# Patient Record
Sex: FEMALE | Race: OTHER | NOT HISPANIC OR LATINO | URBAN - METROPOLITAN AREA
[De-identification: names, ages, dates, MRNs, and addresses within clinical notes are randomized per-mention and may not be internally consistent; named-entity substitution may affect disease eponyms.]

---

## 2017-11-10 VITALS
TEMPERATURE: 99 F | RESPIRATION RATE: 16 BRPM | DIASTOLIC BLOOD PRESSURE: 68 MMHG | OXYGEN SATURATION: 90 % | SYSTOLIC BLOOD PRESSURE: 97 MMHG | HEART RATE: 71 BPM

## 2017-11-10 PROCEDURE — 71020: CPT | Mod: 26

## 2017-11-10 PROCEDURE — 99285 EMERGENCY DEPT VISIT HI MDM: CPT

## 2017-11-10 RX ORDER — MAGNESIUM SULFATE 500 MG/ML
2 VIAL (ML) INJECTION ONCE
Qty: 0 | Refills: 0 | Status: COMPLETED | OUTPATIENT
Start: 2017-11-10 | End: 2017-11-10

## 2017-11-10 RX ORDER — IPRATROPIUM/ALBUTEROL SULFATE 18-103MCG
3 AEROSOL WITH ADAPTER (GRAM) INHALATION EVERY 6 HOURS
Qty: 0 | Refills: 0 | Status: DISCONTINUED | OUTPATIENT
Start: 2017-11-10 | End: 2017-11-11

## 2017-11-10 RX ORDER — ALBUTEROL 90 UG/1
1 AEROSOL, METERED ORAL EVERY 4 HOURS
Qty: 0 | Refills: 0 | Status: DISCONTINUED | OUTPATIENT
Start: 2017-11-10 | End: 2017-11-11

## 2017-11-10 RX ORDER — AZITHROMYCIN 500 MG/1
500 TABLET, FILM COATED ORAL ONCE
Qty: 0 | Refills: 0 | Status: COMPLETED | OUTPATIENT
Start: 2017-11-10 | End: 2017-11-10

## 2017-11-10 RX ADMIN — Medication 3 MILLILITER(S): at 17:45

## 2017-11-10 RX ADMIN — AZITHROMYCIN 500 MILLIGRAM(S): 500 TABLET, FILM COATED ORAL at 21:21

## 2017-11-10 RX ADMIN — Medication 3 MILLILITER(S): at 21:21

## 2017-11-10 RX ADMIN — Medication 50 GRAM(S): at 21:42

## 2017-11-10 RX ADMIN — Medication 3 MILLILITER(S): at 18:06

## 2017-11-10 RX ADMIN — Medication 60 MILLIGRAM(S): at 18:23

## 2017-11-10 NOTE — ED PROVIDER NOTE - CARE PLAN
Principal Discharge DX:	Mild intermittent asthma with exacerbation Principal Discharge DX:	Multifocal pneumonia  Secondary Diagnosis:	Asthma

## 2017-11-10 NOTE — ED PROVIDER NOTE - MEDICAL DECISION MAKING DETAILS
Pt  who admits to using methadone PTA presents c/o asthma exacerbation. Will give prednisone and albuterol treatment for asthma exacerbation. Pt  who admits to using methadone PTA presents c/o asthma exacerbation. Will give prednisone and albuterol treatment for asthma exacerbation. Will get CXR given swetha Pt  who admits to using methadone PTA presents c/o asthma exacerbation. Will give prednisone and albuterol treatment for asthma exacerbation. Will get CXR given rhonchi.  (SUKHJINDER Fagan) +multifocal PNA on imaging. Pt with persistent hypoxia, SOB and wheezing despite neb tx's, steroids and Mg Sulfate. ABX initiated and case discussed with Dr. Ellington who has accepted the pt to [facility].

## 2017-11-10 NOTE — ED PROVIDER NOTE - NS ED ROS FT
Denies fevers, chills, nausea, vomiting, diarrhea, constipation, abdominal pain, urinary symptoms, chest pain, palpitations, syncope/near syncope, cough/URI symptoms, headache, weakness, numbness, focal deficits, visual changes, gait or balance changes, dizziness

## 2017-11-10 NOTE — ED PROVIDER NOTE - OBJECTIVE STATEMENT
31 yo F with Hx of asthma presents c/o wheezing. Pt states has generalized body aches. Hx limited at this time due to pt being highly drowsy and falling asleep while giving Hx. Pt admits to triage on methadone.

## 2017-11-10 NOTE — ED PROVIDER NOTE - PHYSICAL EXAMINATION
VITAL SIGNS: I have reviewed nursing notes and confirm.  CONSTITUTIONAL: Drowsy but responds to physical stimulus.  SKIN: Skin is warm and dry, no acute rash.  HEAD: Normocephalic; atraumatic.  EYES: PERRL, EOM intact; conjunctiva and sclera clear.  ENT: No nasal discharge; airway clear.  NECK: Supple; non tender.  CARD: S1, S2 normal; no murmurs, gallops, or rubs. Regular rate and rhythm.  RESP: Bilateral diffuse wheezing.  ABD: Normal bowel sounds; soft; non-distended; non-tender; no hepatosplenomegaly.  EXT: Normal ROM. No clubbing, cyanosis or edema.  NEURO: Alert, oriented. Grossly unremarkable.  PSYCH: Cooperative, appropriate. VITAL SIGNS: I have reviewed nursing notes and confirm.  CONSTITUTIONAL: Drowsy/lethargic but responds to physical stimulus.  SKIN: Skin is warm and dry, no acute rash.  HEAD: Normocephalic; atraumatic.  EYES: PERRL, EOM intact; conjunctiva and sclera clear.  ENT: No nasal discharge; airway clear.  NECK: Supple; non tender.  CARD: S1, S2 normal; no murmurs, gallops, or rubs. Regular rate and rhythm.  RESP: Bilateral diffuse wheezing and rhonchi  ABD: Normal bowel sounds; soft; non-distended; non-tender; no hepatosplenomegaly.  EXT: Normal ROM. No clubbing, cyanosis or edema.  NEURO: Alert, oriented. Grossly unremarkable.  PSYCH: Cooperative, appropriate.

## 2017-11-11 ENCOUNTER — INPATIENT (INPATIENT)
Facility: HOSPITAL | Age: 32
LOS: 1 days | Discharge: HOME | DRG: 194 | End: 2017-11-13
Attending: STUDENT IN AN ORGANIZED HEALTH CARE EDUCATION/TRAINING PROGRAM | Admitting: STUDENT IN AN ORGANIZED HEALTH CARE EDUCATION/TRAINING PROGRAM
Payer: COMMERCIAL

## 2017-11-11 DIAGNOSIS — J45.901 UNSPECIFIED ASTHMA WITH (ACUTE) EXACERBATION: ICD-10-CM

## 2017-11-11 DIAGNOSIS — Z29.9 ENCOUNTER FOR PROPHYLACTIC MEASURES, UNSPECIFIED: ICD-10-CM

## 2017-11-11 DIAGNOSIS — B19.20 UNSPECIFIED VIRAL HEPATITIS C WITHOUT HEPATIC COMA: ICD-10-CM

## 2017-11-11 DIAGNOSIS — R63.8 OTHER SYMPTOMS AND SIGNS CONCERNING FOOD AND FLUID INTAKE: ICD-10-CM

## 2017-11-11 DIAGNOSIS — F19.10 OTHER PSYCHOACTIVE SUBSTANCE ABUSE, UNCOMPLICATED: ICD-10-CM

## 2017-11-11 DIAGNOSIS — J18.9 PNEUMONIA, UNSPECIFIED ORGANISM: ICD-10-CM

## 2017-11-11 LAB
ALBUMIN SERPL ELPH-MCNC: 3.4 G/DL — SIGNIFICANT CHANGE UP (ref 3.4–5)
ALP SERPL-CCNC: 78 U/L — SIGNIFICANT CHANGE UP (ref 40–120)
ALT FLD-CCNC: 223 U/L — HIGH (ref 12–42)
ANION GAP SERPL CALC-SCNC: 5 MMOL/L — LOW (ref 9–16)
AST SERPL-CCNC: 60 U/L — HIGH (ref 15–37)
BASOPHILS NFR BLD AUTO: 0.4 % — SIGNIFICANT CHANGE UP (ref 0–2)
BILIRUB SERPL-MCNC: 0.4 MG/DL — SIGNIFICANT CHANGE UP (ref 0.2–1.2)
BUN SERPL-MCNC: 8 MG/DL — SIGNIFICANT CHANGE UP (ref 7–23)
CALCIUM SERPL-MCNC: 8.9 MG/DL — SIGNIFICANT CHANGE UP (ref 8.5–10.5)
CHLORIDE SERPL-SCNC: 103 MMOL/L — SIGNIFICANT CHANGE UP (ref 96–108)
CO2 SERPL-SCNC: 31 MMOL/L — SIGNIFICANT CHANGE UP (ref 22–31)
CREAT SERPL-MCNC: 0.89 MG/DL — SIGNIFICANT CHANGE UP (ref 0.5–1.3)
EOSINOPHIL NFR BLD AUTO: 0.4 % — SIGNIFICANT CHANGE UP (ref 0–6)
GLUCOSE SERPL-MCNC: 260 MG/DL — HIGH (ref 70–99)
HCG UR QL: NEGATIVE — SIGNIFICANT CHANGE UP
HCT VFR BLD CALC: 40 % — SIGNIFICANT CHANGE UP (ref 34.5–45)
HGB BLD-MCNC: 13.2 G/DL — SIGNIFICANT CHANGE UP (ref 11.5–15.5)
IMM GRANULOCYTES NFR BLD AUTO: 0.4 % — SIGNIFICANT CHANGE UP (ref 0–1.5)
LYMPHOCYTES # BLD AUTO: 18.4 % — SIGNIFICANT CHANGE UP (ref 13–44)
MCHC RBC-ENTMCNC: 31.5 PG — SIGNIFICANT CHANGE UP (ref 27–34)
MCHC RBC-ENTMCNC: 33 G/DL — SIGNIFICANT CHANGE UP (ref 32–36)
MCV RBC AUTO: 95.5 FL — SIGNIFICANT CHANGE UP (ref 80–100)
MONOCYTES NFR BLD AUTO: 1.3 % — LOW (ref 2–14)
NEUTROPHILS NFR BLD AUTO: 79.1 % — HIGH (ref 43–77)
PCP SPEC-MCNC: SIGNIFICANT CHANGE UP
PLATELET # BLD AUTO: 234 K/UL — SIGNIFICANT CHANGE UP (ref 150–400)
POTASSIUM SERPL-MCNC: 4.2 MMOL/L — SIGNIFICANT CHANGE UP (ref 3.5–5.3)
POTASSIUM SERPL-SCNC: 4.2 MMOL/L — SIGNIFICANT CHANGE UP (ref 3.5–5.3)
PROT SERPL-MCNC: 7 G/DL — SIGNIFICANT CHANGE UP (ref 6.4–8.2)
RAPID RVP RESULT: SIGNIFICANT CHANGE UP
RBC # BLD: 4.19 M/UL — SIGNIFICANT CHANGE UP (ref 3.8–5.2)
RBC # FLD: 12.8 % — SIGNIFICANT CHANGE UP (ref 10.3–16.9)
SODIUM SERPL-SCNC: 139 MMOL/L — SIGNIFICANT CHANGE UP (ref 132–145)
WBC # BLD: 5.5 K/UL — SIGNIFICANT CHANGE UP (ref 3.8–10.5)
WBC # FLD AUTO: 5.5 K/UL — SIGNIFICANT CHANGE UP (ref 3.8–10.5)

## 2017-11-11 PROCEDURE — 99223 1ST HOSP IP/OBS HIGH 75: CPT | Mod: GC

## 2017-11-11 PROCEDURE — 71250 CT THORAX DX C-: CPT | Mod: 26

## 2017-11-11 RX ORDER — METHADONE HYDROCHLORIDE 40 MG/1
110 TABLET ORAL DAILY
Qty: 0 | Refills: 0 | Status: DISCONTINUED | OUTPATIENT
Start: 2017-11-11 | End: 2017-11-13

## 2017-11-11 RX ORDER — AZITHROMYCIN 500 MG/1
250 TABLET, FILM COATED ORAL DAILY
Qty: 0 | Refills: 0 | Status: DISCONTINUED | OUTPATIENT
Start: 2017-11-11 | End: 2017-11-12

## 2017-11-11 RX ORDER — METHADONE HYDROCHLORIDE 40 MG/1
110 TABLET ORAL DAILY
Qty: 0 | Refills: 0 | Status: DISCONTINUED | OUTPATIENT
Start: 2017-11-11 | End: 2017-11-11

## 2017-11-11 RX ORDER — NICOTINE POLACRILEX 2 MG
1 GUM BUCCAL DAILY
Qty: 0 | Refills: 0 | Status: DISCONTINUED | OUTPATIENT
Start: 2017-11-11 | End: 2017-11-13

## 2017-11-11 RX ORDER — CEFTRIAXONE 500 MG/1
1 INJECTION, POWDER, FOR SOLUTION INTRAMUSCULAR; INTRAVENOUS ONCE
Qty: 0 | Refills: 0 | Status: COMPLETED | OUTPATIENT
Start: 2017-11-11 | End: 2017-11-11

## 2017-11-11 RX ORDER — IPRATROPIUM/ALBUTEROL SULFATE 18-103MCG
3 AEROSOL WITH ADAPTER (GRAM) INHALATION EVERY 4 HOURS
Qty: 0 | Refills: 0 | Status: DISCONTINUED | OUTPATIENT
Start: 2017-11-11 | End: 2017-11-13

## 2017-11-11 RX ORDER — METHADONE HYDROCHLORIDE 40 MG/1
110 TABLET ORAL
Qty: 0 | Refills: 0 | COMMUNITY

## 2017-11-11 RX ORDER — GABAPENTIN 400 MG/1
1 CAPSULE ORAL
Qty: 0 | Refills: 0 | COMMUNITY

## 2017-11-11 RX ORDER — CEFTRIAXONE 500 MG/1
1 INJECTION, POWDER, FOR SOLUTION INTRAMUSCULAR; INTRAVENOUS EVERY 24 HOURS
Qty: 0 | Refills: 0 | Status: DISCONTINUED | OUTPATIENT
Start: 2017-11-12 | End: 2017-11-12

## 2017-11-11 RX ORDER — IPRATROPIUM/ALBUTEROL SULFATE 18-103MCG
3 AEROSOL WITH ADAPTER (GRAM) INHALATION ONCE
Qty: 0 | Refills: 0 | Status: COMPLETED | OUTPATIENT
Start: 2017-11-11 | End: 2017-11-11

## 2017-11-11 RX ORDER — TRAZODONE HCL 50 MG
100 TABLET ORAL AT BEDTIME
Qty: 0 | Refills: 0 | Status: DISCONTINUED | OUTPATIENT
Start: 2017-11-11 | End: 2017-11-13

## 2017-11-11 RX ADMIN — Medication 3 MILLILITER(S): at 04:21

## 2017-11-11 RX ADMIN — Medication 1 PATCH: at 11:15

## 2017-11-11 RX ADMIN — Medication 100 MILLIGRAM(S): at 23:35

## 2017-11-11 RX ADMIN — Medication 0.3 MILLIGRAM(S): at 11:52

## 2017-11-11 RX ADMIN — AZITHROMYCIN 250 MILLIGRAM(S): 500 TABLET, FILM COATED ORAL at 21:45

## 2017-11-11 RX ADMIN — CEFTRIAXONE 100 GRAM(S): 500 INJECTION, POWDER, FOR SOLUTION INTRAMUSCULAR; INTRAVENOUS at 04:52

## 2017-11-11 RX ADMIN — Medication 50 MILLIGRAM(S): at 11:16

## 2017-11-11 RX ADMIN — Medication 0.3 MILLIGRAM(S): at 21:45

## 2017-11-11 RX ADMIN — METHADONE HYDROCHLORIDE 110 MILLIGRAM(S): 40 TABLET ORAL at 11:12

## 2017-11-11 RX ADMIN — Medication 3 MILLILITER(S): at 23:35

## 2017-11-11 NOTE — H&P ADULT - NSHPLABSRESULTS_GEN_ALL_CORE
.  LABS:                         13.2   5.5   )-----------( 234      ( 11 Nov 2017 02:14 )             40.0     11-11    139  |  103  |  8   ----------------------------<  260<H>  4.2   |  31  |  0.89    Ca    8.9      11 Nov 2017 02:14    TPro  7.0  /  Alb  3.4  /  TBili  0.4  /  DBili  x   /  AST  60<H>  /  ALT  223<H>  /  AlkPhos  78  11-11    RADIOLOGY, EKG & ADDITIONAL TESTS:   < from: CT Chest No Cont (11.11.17 @ 01:31) >    EXAM:  CT CHEST                        PROCEDURE DATE:  11/11/2017      IMPRESSION:  Mild patchy consolidation within the lingula and left lower lobe, suspicious for multifocal pneumonia.

## 2017-11-11 NOTE — H&P ADULT - NSHPPHYSICALEXAM_GEN_ALL_CORE
.  VITAL SIGNS:  T(C): 36.7 (11-11-17 @ 06:32), Max: 37.1 (11-10-17 @ 17:33)  T(F): 98.1 (11-11-17 @ 06:32), Max: 98.7 (11-10-17 @ 17:33)  HR: 78 (11-11-17 @ 06:32) (71 - 84)  BP: 116/19 (11-11-17 @ 06:32) (97/68 - 119/80)  BP(mean): --  RR: 18 (11-11-17 @ 06:32) (16 - 18)  SpO2: 99% (11-11-17 @ 06:32) (90% - 99%)  Wt(kg): --    PHYSICAL EXAM:    General: AOx3, NAD, no respiratory distress, speaking in full sentences, coherent  HEENT: NCAT, PERRL, clear conjunctiva, no scleral icterus  Neck: supple, no JVD  Respiratory: wheezing b/l with prolonged expiratory phase; rhonchi, rales  Cardiovascular: RRR, normal S1S2, no M/R/G  Abdomen: soft, NT/ND, bowel sounds in all four quadrants, no palpable masses  Extremities: WWP, no clubbing, cyanosis, or edema; no track marks; RUE hyperpigementated flat lesion 2cm diameter; similar flat lesion on LLE lateral foot  Neuro: AOx3

## 2017-11-11 NOTE — H&P ADULT - PROBLEM SELECTOR PLAN 2
History of mild intermittent asthma, with wheezing x 2 days. Wheezing on initial exam, saturating 90% on RA. Given duonebs x 3 at [facility] and prednisone 60mg x 1.  - Continue with duonebs q4h prn for SOB/wheezing.  - Peak flows.  - Incentive spirometry.  - Asthma teaching. History of mild intermittent asthma, with wheezing x 2 days. Wheezing on initial exam, saturating 90% on RA. Given duonebs x 3 at [facility] and prednisone 60mg x 1, 50mg x 1 on 11/11.  - Continue with duonebs q4h prn for SOB/wheezing.  - Continue with prednisone 60mg qd.  - Check peak flows. Baseline 250.  - Incentive spirometry.  - Asthma teaching.

## 2017-11-11 NOTE — H&P ADULT - PROBLEM SELECTOR PLAN 6
VTE ppx: no indication for pharmacoprophylaxis at this time given low risk for VTE (IMPROVE score of 0).    Code: FULL CODE.

## 2017-11-11 NOTE — H&P ADULT - PROBLEM SELECTOR PLAN 1
Patient presented with SOB and wheezing x 2 days. No SIRS on presentation and hemodynamically stable, however desaturating to 90% on RA. Non-toxic appearing, wheezing on exam, no crackles. CT chest with mild patchy consolidation within the lingula and left lower lobe, suspicious for multifocal pneumonia. Given ceftriaxone and azithromycin at [facility].  - Continue on ceftriaxone and azithromycin. Patient with MRSA risk factor (former IVDU, past incarceration), but given non-toxic appearance, afebrile, and no leukocytosis, will treat for CAP at this time.  - Follow up RVP.

## 2017-11-11 NOTE — H&P ADULT - PROBLEM SELECTOR PLAN 4
Reported history of hepatitis C. , AST 60. No signs of acute decompensation.  - No acute intervention. Reported history of hepatitis C. , AST 60. No signs of acute decompensation.  - Screen for HIV, HBV.

## 2017-11-11 NOTE — H&P ADULT - ATTENDING COMMENTS
Pt seen and examined by me at bedside. Agree with housestaff's exam/a/p as noted above with addition, s  states has hx of asthma takes albuterol prn, no hx of intubation for asthma but for IVDU.   was at [facility] approx 2 weeks ago for asthma exacerbation and prescribed with steroid and abx (?bactrim)     VSS  speaking full sentences and ambulating in the hallways   +S1/S2 RRR  +diffuse wheezing, fair air entry  labs reviewed    a/p:  1. Asthma exacerbation 2/2 multifocal PNA: ok to c/w prednisone 60mg po qdaily, c/w ceftriaxone/azithromycin, nebs atc, daily peak flow (baseline 250 as per pt)  2. IVDU: on methadone/clonidine  Agree with rest of a/p as above  Dispo: pending clinical improvement.

## 2017-11-11 NOTE — H&P ADULT - HISTORY OF PRESENT ILLNESS
31 y/o F with PMHx of multi-substance abuse (heroin, benzos) recently discharged from detox, mild intermittent asthma (dx 2014, no hospitalizations, no intubations), presented to [facility] with SOB x 2 days.    Otherwise denies fever, chills, lightheadedness, CP, palpitations, SOB, cough, URI symptoms, N/V/D/C, abdominal pain, dysuria, hematuria, changes in bowel habits, LE edema.    In the ED, T   CT chest with mild patchy consolidation within the lingula and left lower lobe    PMHx: as above.  Surg hx: as above.  Meds: methadone 110mg (verified with [facility] [place] [phone] with LUKE Shafer LPN), clonidine 0.3mg bid,   All: toradol, tramadol  Soc hx:   Fhx: non-contributory 31 y/o F with PMHx of Hepatitis C, multi-substance abuse (former IV, current sniffed heroin, Xanax abuse, intubated for heroin/benzo overdose 1 year ago at [place]), recently discharged from detox, mild intermittent asthma (dx 2014, no hospitalizations, no intubations), presented to [facility] with SOB and wheezing x 2 days. Also complains of lightheadedness, sore throat, increased sinus fullness, and ear fullness x 2 days. Denies fever, chills, cough, hemoptysis, rhinorrhea, sneezing, recent travel, sick contacts. Was in detox at [facility] for benzo abuse and discharged two days ago. Patient complained of SOB and wheezing on her last day at detox, but states that they withheld her albuterol inhaler. Completed librium taper, 50mg to 25mg to 10mg last dose Thursday, as per patient. Was planned for transfer to rehab at [facility], but bed was not available. Last methadone dose 110mg yesterday. Goes to [facility]. Otherwise denies CP, palpitations, N/V/D/C, abdominal pain, dysuria, hematuria, changes in bowel habits, LE edema.    In the ED, T 98.7, HR 71-84, BP /68-80, RR 16-18, O2 90-95% on RA. Labs notable for no leukocytosis, , AST 60. CT chest with mild patchy consolidation within the lingula and left lower lobe. Given prednisone 60mg x 1, ceftriaxone 1g, azithromycin 500mg x 1, duonebs x 3, Mg 2g x 1.     PMHx: as above.  Surg hx: as above.  Meds: methadone 110mg (verified with [facility] [phone] with LUKE Shafer LPN), clonidine 0.3mg bid, gabapentin 600mg q6h.   All: toradol, tramadol  Soc hx: tobacco 1.5 ppd x 16 years, EtOH denies, quit "real" heroin 6 months ago, still uses heroin derivative in [facility] called "fent" occasionally but has not used for two weeks, her mother has guardianship of her 11 year old son, recent incarcerations for home invasion, burglary, assault/battery  Fhx: non-contributory 33 y/o F with PMHx of Hepatitis C, multi-substance abuse (former IV, current sniffed heroin, Xanax abuse, intubated for heroin/benzo overdose 1 year ago at [place]), recently discharged from detox, mild intermittent asthma (dx 2014, no hospitalizations, no intubations), presented to [facility] with SOB and wheezing x 2 days. Also complains of lightheadedness, sore throat, increased sinus fullness, and ear fullness x 2 days. Denies fever, chills, cough, hemoptysis, rhinorrhea, sneezing, recent travel, sick contacts. Was in detox at [facility] for benzo abuse and discharged two days ago. Patient complained of SOB and wheezing on her last day at detox, but states that they withheld her albuterol inhaler. Completed librium taper, 50mg to 25mg to 10mg last dose Thursday, as per patient. Was planned for transfer to rehab at [facility], but bed was not available. Last methadone dose 110mg yesterday. Goes to [facility]. Otherwise denies CP, palpitations, N/V/D/C, abdominal pain, dysuria, hematuria, changes in bowel habits, LE edema.    In the ED, T 98.7, HR 71-84, BP /68-80, RR 16-18, O2 90-95% on RA. Labs notable for no leukocytosis, , AST 60. CT chest with mild patchy consolidation within the lingula and left lower lobe. Given prednisone 60mg x 1, ceftriaxone 1g, azithromycin 500mg x 1, duonebs x 3, Mg 2g x 1.     PMHx: as above.  Surg hx: as above.  Meds: methadone 110mg (verified with [facility] [phone] with LUKE Shafer LPN), clonidine 0.3mg bid, gabapentin 600mg q6h.   All: toradol, tramadol  Soc hx: tobacco 1.5 ppd x 16 years, EtOH denies, quit "real" heroin 6 months ago, still uses heroin derivative in [facility] called "fent" occasionally but has not used for two weeks, Xanax was taking 2mg tabs 15x/day previously, her mother has guardianship of her 11 year old son, recent incarcerations for home invasion, burglary, assault/battery  Fhx: non-contributory

## 2017-11-11 NOTE — H&P ADULT - PROBLEM SELECTOR PLAN 3
History of former IV heroin use, current heroin sniffing, and recent Xanax abuse, s/p detox stay. Last heroin use two weeks ago. Last methadone dose 110mg yesterday. Goes to [facility], confirmed 110mg dose. Does not appear to be actively withdrawing.  - Continue with methadone 110mg qd. History of former IV heroin use, current heroin sniffing, and recent Xanax abuse, s/p detox stay. Last heroin use two weeks ago. Last methadone dose 110mg yesterday. Goes to [facility], confirmed 110mg dose. Does not appear to be actively withdrawing.  - Continue with methadone 110mg qd.  - Continue with clonidine 0.3mg bid.

## 2017-11-11 NOTE — H&P ADULT - ASSESSMENT
31 y/o F with PMHx of polysubstance abuse (heroin, benzos) recently discharged from detox, mild intermittent asthma (dx 2014, no hospitalizations, no intubations), presented to [facility] with SOB x 2 days.

## 2017-11-12 PROCEDURE — 93010 ELECTROCARDIOGRAM REPORT: CPT

## 2017-11-12 PROCEDURE — 99233 SBSQ HOSP IP/OBS HIGH 50: CPT | Mod: GC

## 2017-11-12 RX ORDER — GLYCERIN 1 %
1 DROPS OPHTHALMIC (EYE)
Qty: 0 | Refills: 0 | Status: DISCONTINUED | OUTPATIENT
Start: 2017-11-12 | End: 2017-11-13

## 2017-11-12 RX ADMIN — AZITHROMYCIN 250 MILLIGRAM(S): 500 TABLET, FILM COATED ORAL at 10:04

## 2017-11-12 RX ADMIN — Medication 0.3 MILLIGRAM(S): at 21:55

## 2017-11-12 RX ADMIN — Medication 3 MILLILITER(S): at 14:02

## 2017-11-12 RX ADMIN — Medication 1 DROP(S): at 15:46

## 2017-11-12 RX ADMIN — Medication 3 MILLILITER(S): at 21:55

## 2017-11-12 RX ADMIN — Medication 1 PATCH: at 10:05

## 2017-11-12 RX ADMIN — Medication 60 MILLIGRAM(S): at 06:30

## 2017-11-12 RX ADMIN — Medication 1 PATCH: at 11:41

## 2017-11-12 RX ADMIN — METHADONE HYDROCHLORIDE 110 MILLIGRAM(S): 40 TABLET ORAL at 10:04

## 2017-11-12 RX ADMIN — Medication 3 MILLILITER(S): at 10:04

## 2017-11-12 RX ADMIN — Medication 100 MILLIGRAM(S): at 21:55

## 2017-11-12 RX ADMIN — Medication 0.3 MILLIGRAM(S): at 10:04

## 2017-11-12 NOTE — DISCHARGE NOTE ADULT - HOSPITAL COURSE
Patient 32 year old woman with hx of substance abuse and asthma who presented with acute worsening of SOB.  Patient is afebrile, non-tachycardic, blood pressure stable. Patient CXR suggestive of bacterial pneumonia started on ceftriaxone and azithromycin.  Patient lost IV access on Saturday night switched to PO levaquin.  Patient discharged on cefpoxidime (7 days), and azithromycin (5 days).

## 2017-11-12 NOTE — DISCHARGE NOTE ADULT - CARE PROVIDERS DIRECT ADDRESSES
[email or web address]

## 2017-11-12 NOTE — PROGRESS NOTE ADULT - PROBLEM SELECTOR PLAN 3
on methadone/clonidine
History of former IV heroin use, current heroin sniffing, and recent Xanax abuse, s/p detox stay. Last heroin use two weeks ago. Last methadone dose 110mg yesterday. Goes to [facility], confirmed 110mg dose. Does not appear to be actively withdrawing.  - Continue with methadone 110mg qd.  - Continue with clonidine 0.3mg bid.

## 2017-11-12 NOTE — DISCHARGE NOTE ADULT - CARE PLAN
Principal Discharge DX:	Multifocal pneumonia  Goal:	Resolution  Instructions for follow-up, activity and diet:	Please continue with antibiotics as prescribed.  Secondary Diagnosis:	Asthma  Instructions for follow-up, activity and diet:	Please continue with prednisone taper as prescribed  Secondary Diagnosis:	Substance abuse  Instructions for follow-up, activity and diet:	Please continue with your outpatient regimen. Principal Discharge DX:	Asthma exacerbation  Goal:	Resolution  Instructions for follow-up, activity and diet:	Please continue with prednisone taper as prescribed  Secondary Diagnosis:	Multifocal pneumonia  Instructions for follow-up, activity and diet:	please continue with your antibiotics as presecribed  Secondary Diagnosis:	Substance abuse  Instructions for follow-up, activity and diet:	Please continue with your outpatient regimen.  Secondary Diagnosis:	Hepatitis C  Instructions for follow-up, activity and diet:	please followup with your PMD.

## 2017-11-12 NOTE — PROGRESS NOTE ADULT - SUBJECTIVE AND OBJECTIVE BOX
Patient is a 32y old  Female who presents with a chief complaint of SOB and wheezing (12 Nov 2017 11:30)      INTERVAL HPI/OVERNIGHT EVENTS:    Review of Systems: 12 point review of systems otherwise negative    MEDICATIONS  (STANDING):  ALBUTerol/ipratropium for Nebulization 3 milliLiter(s) Nebulizer every 4 hours  cloNIDine 0.3 milliGRAM(s) Oral two times a day  levoFLOXacin  Tablet 500 milliGRAM(s) Oral every 24 hours  methadone    Tablet 110 milliGRAM(s) Oral daily  nicotine -  14 mG/24Hr(s) Patch 1 patch Transdermal daily  predniSONE   Tablet 60 milliGRAM(s) Oral daily  traZODone 100 milliGRAM(s) Oral at bedtime    MEDICATIONS  (PRN):      Allergies    Toradol (Rash)  tramadol (Rash)    Intolerances          Vital Signs Last 24 Hrs  T(C): 36.3 (12 Nov 2017 09:00), Max: 37.1 (11 Nov 2017 16:20)  T(F): 97.3 (12 Nov 2017 09:00), Max: 98.7 (11 Nov 2017 16:20)  HR: 65 (12 Nov 2017 09:00) (53 - 76)  BP: 114/77 (12 Nov 2017 09:00) (114/77 - 129/87)  BP(mean): --  RR: 18 (12 Nov 2017 09:00) (17 - 18)  SpO2: 93% (12 Nov 2017 09:00) (93% - 94%)  CAPILLARY BLOOD GLUCOSE            Physical Exam:    Daily     Daily   General:  Well appearing, NAD, speaking full sentences  CV:  RRR, no murmur, no JVD  Lungs:  +scattered wheezing, fair air entry  :  No baliey  Neuro:  AAOx3, non-focal, CN II-XII grossly intact      LABS:                        13.2   5.5   )-----------( 234      ( 11 Nov 2017 02:14 )             40.0     11-11    139  |  103  |  8   ----------------------------<  260<H>  4.2   |  31  |  0.89    Ca    8.9      11 Nov 2017 02:14    TPro  7.0  /  Alb  3.4  /  TBili  0.4  /  DBili  x   /  AST  60<H>  /  ALT  223<H>  /  AlkPhos  78  11-11

## 2017-11-12 NOTE — PROGRESS NOTE ADULT - ASSESSMENT
31 y/o F with PMHx of polysubstance abuse (heroin, benzos) recently discharged from detox, mild intermittent asthma (dx 2014, no hospitalizations, no intubations), presented to [facility] with SOB x 2 days.

## 2017-11-12 NOTE — DISCHARGE NOTE ADULT - PLAN OF CARE
Resolution Please continue with antibiotics as prescribed. Please continue with prednisone taper as prescribed Please continue with your outpatient regimen. please continue with your antibiotics as presecribed please followup with your PMD.

## 2017-11-12 NOTE — DISCHARGE NOTE ADULT - CARE PROVIDER_API CALL
Savannah Angel), Internal Medicine  [address] [address] [place]  [address]  [place], [place] [address]  Phone: [phone]  Fax: [phone]

## 2017-11-12 NOTE — PROGRESS NOTE ADULT - PROBLEM SELECTOR PLAN 1
Patient presented with SOB and wheezing x 2 days. No SIRS on presentation and hemodynamically stable, however desaturating to 90% on RA. Non-toxic appearing, wheezing on exam, no crackles. CT chest with mild patchy consolidation within the lingula and left lower lobe, suspicious for multifocal pneumonia. Given ceftriaxone and azithromycin at [facility].  - Continue on ceftriaxone and azithromycin. Patient with MRSA risk factor (former IVDU, past incarceration), but given non-toxic appearance, afebrile, and no leukocytosis, will treat for CAP at this time.  - Follow up RVP. Patient presented with SOB and wheezing x 2 days. No SIRS on presentation and hemodynamically stable, however desaturating to 90% on RA. Non-toxic appearing, wheezing on exam, no crackles. CT chest with mild patchy consolidation within the lingula and left lower lobe, suspicious for multifocal pneumonia. Given ceftriaxone and azithromycin at [facility].  - Continued on ceftriaxone and azithromycin. Patient with MRSA risk factor (former IVDU, past incarceration), but given non-toxic appearance, afebrile, and no leukocytosis, will treat for CAP at this time.  - Patient lost IV access, started on levaquin 750mg q 24 tx for CAP Patient presented with SOB and wheezing x 2 days. No SIRS on presentation and hemodynamically stable, however desaturating to 90% on RA. Non-toxic appearing, wheezing on exam, no crackles. CT chest with mild patchy consolidation within the lingula and left lower lobe, suspicious for multifocal pneumonia. Given ceftriaxone and azithromycin at [facility].  - Continued on ceftriaxone and azithromycin. Patient with MRSA risk factor (former IVDU, past incarceration), but given non-toxic appearance, afebrile, and no leukocytosis, will treat for CAP at this time.  - Patient lost IV access, started on levaquin 750mg q 24 tx for CAP  - RVP negative

## 2017-11-12 NOTE — DISCHARGE NOTE ADULT - MEDICATION SUMMARY - MEDICATIONS TO TAKE
I will START or STAY ON the medications listed below when I get home from the hospital:    Deltasone 20 mg oral tablet  -- Prednisone taper 60, 60, 40, 40, 20, 20 MDD:Prenisone taper 60 (3tabs), 60, 40 (2tabs), 40, 20 (1tab), 20  -- Indication: For Asthma    methadone  -- 110 milligram(s) by mouth once a day  -- Indication: For Polysubstance abuse    cloNIDine 0.3 mg oral tablet  -- 1 tab(s) by mouth every 8 hours  -- Indication: For Anxiety    gabapentin 600 mg oral tablet  -- 1 tab(s) by mouth every 6 hours  -- Indication: For Pain    cefpodoxime 200 mg oral tablet  -- 1 tab(s) by mouth 2 times a day   -- Finish all this medication unless otherwise directed by prescriber.  Take with food or milk.    -- Indication: For CAP    azithromycin 250 mg oral tablet  -- 2 tabs day 1, 1 tab daily 2-5  -- Do not take dairy products, antacids, or iron preparations within one hour of this medication.  Finish all this medication unless otherwise directed by prescriber.    -- Indication: For CAP

## 2017-11-12 NOTE — DISCHARGE NOTE ADULT - PATIENT PORTAL LINK FT
“You can access the [facility] Patient Portal, offered by [facility], by registering with the following website: [email or web address]”

## 2017-11-12 NOTE — PROGRESS NOTE ADULT - PROBLEM SELECTOR PLAN 4
Reported history of hepatitis C. , AST 60. No signs of acute decompensation.  - Screen for HIV, HBV. Reported history of hepatitis C. , AST 60. No signs of acute decompensation.

## 2017-11-12 NOTE — PROGRESS NOTE ADULT - SUBJECTIVE AND OBJECTIVE BOX
INTERVAL HPI/OVERNIGHT EVENTS:  Patient was seen and examined at bedside.  Patient lost IV access yesterday.  Patient complaining of some SOB.  Denies fevers or chills. Denies cough. ROS otherwise negative.     VITAL SIGNS:  T(F): 97.3 (11-12-17 @ 09:00)  HR: 65 (11-12-17 @ 09:00)  BP: 114/77 (11-12-17 @ 09:00)  RR: 18 (11-12-17 @ 09:00)  SpO2: 93% (11-12-17 @ 09:00)      PHYSICAL EXAM:    Constitutional: WDWN, NAD  HEENT: PERRL, EOMI,  no JVD, MMM.  Respiratory: Patient with diffuse wheezing anteriorly and posteriorly.  Without accessory muscle use.  Patient satting 9  Cardiovascular: RRR, normal S1S2, no M/R/G  Gastrointestinal: soft, NTND, no masses palpable, BS normal  Extremities: Warm, well perfused, pulses equal bilateral upper and lower extremities, no edema, no clubbing  Neurological: AAOx3, CN Grossly intact  Skin: Normal temperature, warm, dry    MEDICATIONS  (STANDING):  ALBUTerol/ipratropium for Nebulization 3 milliLiter(s) Nebulizer every 4 hours  cloNIDine 0.3 milliGRAM(s) Oral two times a day  levoFLOXacin  Tablet 500 milliGRAM(s) Oral every 24 hours  methadone    Tablet 110 milliGRAM(s) Oral daily  nicotine -  14 mG/24Hr(s) Patch 1 patch Transdermal daily  predniSONE   Tablet 60 milliGRAM(s) Oral daily  traZODone 100 milliGRAM(s) Oral at bedtime    MEDICATIONS  (PRN):      Allergies    Toradol (Rash)  tramadol (Rash)    Intolerances        LABS:                        13.2   5.5   )-----------( 234      ( 11 Nov 2017 02:14 )             40.0     11-11    139  |  103  |  8   ----------------------------<  260<H>  4.2   |  31  |  0.89    Ca    8.9      11 Nov 2017 02:14    TPro  7.0  /  Alb  3.4  /  TBili  0.4  /  DBili  x   /  AST  60<H>  /  ALT  223<H>  /  AlkPhos  78  11-11          RADIOLOGY & ADDITIONAL TESTS:  Reviewed INTERVAL HPI/OVERNIGHT EVENTS:  Patient was seen and examined at bedside.  Patient lost IV access yesterday.  Patient complaining of some SOB.  Denies fevers or chills. Denies cough. ROS otherwise negative.     VITAL SIGNS:  T(F): 97.3 (11-12-17 @ 09:00)  HR: 65 (11-12-17 @ 09:00)  BP: 114/77 (11-12-17 @ 09:00)  RR: 18 (11-12-17 @ 09:00)  SpO2: 93% (11-12-17 @ 09:00)      PHYSICAL EXAM:    Constitutional: WDWN, NAD  HEENT: PERRL, EOMI,  no JVD, MMM.  Respiratory: Patient with diffuse wheezing anteriorly and posteriorly.  Without accessory muscle use.  Patient satting 91 sitting, 92 walking.  Cardiovascular: RRR, normal S1S2, no M/R/G  Gastrointestinal: soft, NTND,  BS normal  Extremities: Warm, well perfused, no edema.  Neurological: AAOx3, CN Grossly intact  Skin: Normal temperature, warm, dry    MEDICATIONS  (STANDING):  ALBUTerol/ipratropium for Nebulization 3 milliLiter(s) Nebulizer every 4 hours  cloNIDine 0.3 milliGRAM(s) Oral two times a day  levoFLOXacin  Tablet 500 milliGRAM(s) Oral every 24 hours  methadone    Tablet 110 milliGRAM(s) Oral daily  nicotine -  14 mG/24Hr(s) Patch 1 patch Transdermal daily  predniSONE   Tablet 60 milliGRAM(s) Oral daily  traZODone 100 milliGRAM(s) Oral at bedtime    MEDICATIONS  (PRN):      Allergies    Toradol (Rash)  tramadol (Rash)    Intolerances        LABS:                        13.2   5.5   )-----------( 234      ( 11 Nov 2017 02:14 )             40.0     11-11    139  |  103  |  8   ----------------------------<  260<H>  4.2   |  31  |  0.89    Ca    8.9      11 Nov 2017 02:14    TPro  7.0  /  Alb  3.4  /  TBili  0.4  /  DBili  x   /  AST  60<H>  /  ALT  223<H>  /  AlkPhos  78  11-11          RADIOLOGY & ADDITIONAL TESTS:  Reviewed

## 2017-11-12 NOTE — PROGRESS NOTE ADULT - PROBLEM SELECTOR PLAN 2
History of mild intermittent asthma, with wheezing x 2 days. Wheezing on initial exam, saturating 90% on RA. Given duonebs x 3 at [facility] and prednisone 60mg x 1, 50mg x 1 on 11/11.  - Continue with duonebs q4h prn for SOB/wheezing.  - Continue with prednisone 60mg qd.  - Check peak flows. Baseline 250.  - Incentive spirometry.  - Asthma teaching. History of mild intermittent asthma, with wheezing x 2 days. Wheezing on initial exam, saturating 90% on RA. Given duonebs x 3 at [facility] and prednisone 60mg x 1, 50mg x 1 on 11/11.  - Continue with duonebs q4h prn for SOB/wheezing.  - Continue with prednisone 60mg qd, on discharge will send on taper [id number]  - Asthma teaching. History of mild intermittent asthma, with wheezing x 2 days. Wheezing on initial exam, saturating 90% on RA. Given duonebs x 3 at [facility] and prednisone 60mg x 1, 50mg x 1 on 11/11.  - Continue with duonebs q4h standing for SOB/wheezing.  - Continue with prednisone 60mg qd, on discharge will send on taper [id number]  - Asthma teaching.

## 2017-11-13 VITALS
DIASTOLIC BLOOD PRESSURE: 80 MMHG | SYSTOLIC BLOOD PRESSURE: 125 MMHG | RESPIRATION RATE: 18 BRPM | TEMPERATURE: 98 F | OXYGEN SATURATION: 94 % | HEART RATE: 55 BPM

## 2017-11-13 PROCEDURE — 81025 URINE PREGNANCY TEST: CPT

## 2017-11-13 PROCEDURE — 99239 HOSP IP/OBS DSCHRG MGMT >30: CPT

## 2017-11-13 PROCEDURE — 93005 ELECTROCARDIOGRAM TRACING: CPT

## 2017-11-13 PROCEDURE — 87633 RESP VIRUS 12-25 TARGETS: CPT

## 2017-11-13 PROCEDURE — 80307 DRUG TEST PRSMV CHEM ANLYZR: CPT

## 2017-11-13 PROCEDURE — 85025 COMPLETE CBC W/AUTO DIFF WBC: CPT

## 2017-11-13 PROCEDURE — 87798 DETECT AGENT NOS DNA AMP: CPT

## 2017-11-13 PROCEDURE — 99285 EMERGENCY DEPT VISIT HI MDM: CPT | Mod: 25

## 2017-11-13 PROCEDURE — 94640 AIRWAY INHALATION TREATMENT: CPT

## 2017-11-13 PROCEDURE — 71250 CT THORAX DX C-: CPT

## 2017-11-13 PROCEDURE — 87486 CHLMYD PNEUM DNA AMP PROBE: CPT

## 2017-11-13 PROCEDURE — 96375 TX/PRO/DX INJ NEW DRUG ADDON: CPT

## 2017-11-13 PROCEDURE — 96374 THER/PROPH/DIAG INJ IV PUSH: CPT

## 2017-11-13 PROCEDURE — 87581 M.PNEUMON DNA AMP PROBE: CPT

## 2017-11-13 PROCEDURE — 80053 COMPREHEN METABOLIC PANEL: CPT

## 2017-11-13 PROCEDURE — 71046 X-RAY EXAM CHEST 2 VIEWS: CPT

## 2017-11-13 RX ORDER — CEFPODOXIME PROXETIL 100 MG
1 TABLET ORAL
Qty: 14 | Refills: 0 | OUTPATIENT
Start: 2017-11-13 | End: 2017-11-20

## 2017-11-13 RX ORDER — AZITHROMYCIN 500 MG/1
1 TABLET, FILM COATED ORAL
Qty: 6 | Refills: 0 | OUTPATIENT
Start: 2017-11-13

## 2017-11-13 RX ADMIN — Medication 0.3 MILLIGRAM(S): at 08:29

## 2017-11-13 RX ADMIN — Medication 60 MILLIGRAM(S): at 06:43

## 2017-11-13 RX ADMIN — METHADONE HYDROCHLORIDE 110 MILLIGRAM(S): 40 TABLET ORAL at 06:44

## 2017-11-15 DIAGNOSIS — J45.21 MILD INTERMITTENT ASTHMA WITH (ACUTE) EXACERBATION: ICD-10-CM

## 2017-11-15 DIAGNOSIS — R09.02 HYPOXEMIA: ICD-10-CM

## 2017-11-15 DIAGNOSIS — Z88.5 ALLERGY STATUS TO NARCOTIC AGENT: ICD-10-CM

## 2017-11-15 DIAGNOSIS — F13.19 SEDATIVE, HYPNOTIC OR ANXIOLYTIC ABUSE WITH UNSPECIFIED SEDATIVE, HYPNOTIC OR ANXIOLYTIC-INDUCED DISORDER: ICD-10-CM

## 2017-11-15 DIAGNOSIS — Z79.891 LONG TERM (CURRENT) USE OF OPIATE ANALGESIC: ICD-10-CM

## 2017-11-15 DIAGNOSIS — F41.9 ANXIETY DISORDER, UNSPECIFIED: ICD-10-CM

## 2017-11-15 DIAGNOSIS — J18.9 PNEUMONIA, UNSPECIFIED ORGANISM: ICD-10-CM

## 2017-11-15 DIAGNOSIS — Z86.19 PERSONAL HISTORY OF OTHER INFECTIOUS AND PARASITIC DISEASES: ICD-10-CM

## 2017-11-15 DIAGNOSIS — F17.210 NICOTINE DEPENDENCE, CIGARETTES, UNCOMPLICATED: ICD-10-CM

## 2017-11-15 DIAGNOSIS — F11.10 OPIOID ABUSE, UNCOMPLICATED: ICD-10-CM

## 2017-11-15 DIAGNOSIS — Z88.8 ALLERGY STATUS TO OTHER DRUGS, MEDICAMENTS AND BIOLOGICAL SUBSTANCES STATUS: ICD-10-CM

## 2017-11-15 DIAGNOSIS — J45.901 UNSPECIFIED ASTHMA WITH (ACUTE) EXACERBATION: ICD-10-CM

## 2020-05-14 NOTE — PATIENT PROFILE ADULT. - ARE SIGNIFICANT INDICATORS COMPLETE.
Blood pressure is adequately controlled.  Continue losartan 100 mg daily, hydralazine 50 mg 3 times daily, carvedilol 25 mg twice daily, furosemide 20 mg daily.  Check metabolic panel today.   Yes

## 2020-11-23 NOTE — ED PROVIDER NOTE - NSCAREINITIATED _GEN_ER
Addended by: GLORIA GARNICA on: 11/23/2020 12:20 PM     Modules accepted: Orders     Mitra Palacios)